# Patient Record
Sex: FEMALE | Race: WHITE | NOT HISPANIC OR LATINO | URBAN - METROPOLITAN AREA
[De-identification: names, ages, dates, MRNs, and addresses within clinical notes are randomized per-mention and may not be internally consistent; named-entity substitution may affect disease eponyms.]

---

## 2018-10-04 ENCOUNTER — OUTPATIENT (OUTPATIENT)
Dept: OUTPATIENT SERVICES | Facility: HOSPITAL | Age: 83
LOS: 1 days | Discharge: HOME | End: 2018-10-04

## 2018-10-04 DIAGNOSIS — E10.9 TYPE 1 DIABETES MELLITUS WITHOUT COMPLICATIONS: ICD-10-CM

## 2018-10-04 DIAGNOSIS — D64.9 ANEMIA, UNSPECIFIED: ICD-10-CM

## 2018-11-05 ENCOUNTER — OUTPATIENT (OUTPATIENT)
Dept: OUTPATIENT SERVICES | Facility: HOSPITAL | Age: 83
LOS: 1 days | Discharge: HOME | End: 2018-11-05

## 2018-11-05 DIAGNOSIS — R79.9 ABNORMAL FINDING OF BLOOD CHEMISTRY, UNSPECIFIED: ICD-10-CM

## 2018-11-05 DIAGNOSIS — D64.9 ANEMIA, UNSPECIFIED: ICD-10-CM

## 2019-01-14 ENCOUNTER — OUTPATIENT (OUTPATIENT)
Dept: OUTPATIENT SERVICES | Facility: HOSPITAL | Age: 84
LOS: 1 days | Discharge: HOME | End: 2019-01-14

## 2019-01-14 DIAGNOSIS — D50.9 IRON DEFICIENCY ANEMIA, UNSPECIFIED: ICD-10-CM

## 2019-01-14 DIAGNOSIS — R53.81 OTHER MALAISE: ICD-10-CM

## 2019-04-27 ENCOUNTER — OUTPATIENT (OUTPATIENT)
Dept: OUTPATIENT SERVICES | Facility: HOSPITAL | Age: 84
LOS: 1 days | Discharge: HOME | End: 2019-04-27

## 2019-04-27 DIAGNOSIS — N39.0 URINARY TRACT INFECTION, SITE NOT SPECIFIED: ICD-10-CM

## 2019-04-30 DIAGNOSIS — I10 ESSENTIAL (PRIMARY) HYPERTENSION: ICD-10-CM

## 2019-04-30 DIAGNOSIS — D64.9 ANEMIA, UNSPECIFIED: ICD-10-CM

## 2019-05-08 ENCOUNTER — OUTPATIENT (OUTPATIENT)
Dept: OUTPATIENT SERVICES | Facility: HOSPITAL | Age: 84
LOS: 1 days | Discharge: HOME | End: 2019-05-08

## 2019-05-08 DIAGNOSIS — R79.9 ABNORMAL FINDING OF BLOOD CHEMISTRY, UNSPECIFIED: ICD-10-CM

## 2019-11-12 ENCOUNTER — OUTPATIENT (OUTPATIENT)
Dept: OUTPATIENT SERVICES | Facility: HOSPITAL | Age: 84
LOS: 1 days | Discharge: HOME | End: 2019-11-12

## 2019-11-12 DIAGNOSIS — R94.5 ABNORMAL RESULTS OF LIVER FUNCTION STUDIES: ICD-10-CM

## 2019-11-12 DIAGNOSIS — A09 INFECTIOUS GASTROENTERITIS AND COLITIS, UNSPECIFIED: ICD-10-CM

## 2019-11-13 ENCOUNTER — OUTPATIENT (OUTPATIENT)
Dept: OUTPATIENT SERVICES | Facility: HOSPITAL | Age: 84
LOS: 1 days | Discharge: HOME | End: 2019-11-13

## 2019-11-14 DIAGNOSIS — N36.9 URETHRAL DISORDER, UNSPECIFIED: ICD-10-CM

## 2020-09-21 ENCOUNTER — EMERGENCY (EMERGENCY)
Facility: HOSPITAL | Age: 85
LOS: 0 days | Discharge: NURSING, REHAB OR LONG-TERM CARE | End: 2020-09-21
Attending: EMERGENCY MEDICINE | Admitting: EMERGENCY MEDICINE
Payer: MEDICARE

## 2020-09-21 VITALS
TEMPERATURE: 99 F | WEIGHT: 142.2 LBS | SYSTOLIC BLOOD PRESSURE: 154 MMHG | DIASTOLIC BLOOD PRESSURE: 116 MMHG | RESPIRATION RATE: 20 BRPM | HEIGHT: 65 IN | OXYGEN SATURATION: 100 % | HEART RATE: 107 BPM

## 2020-09-21 DIAGNOSIS — S09.90XA UNSPECIFIED INJURY OF HEAD, INITIAL ENCOUNTER: ICD-10-CM
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
## 2020-09-21 DIAGNOSIS — G30.9 ALZHEIMER'S DISEASE, UNSPECIFIED: ICD-10-CM

## 2020-09-21 DIAGNOSIS — S00.12XA CONTUSION OF LEFT EYELID AND PERIOCULAR AREA, INITIAL ENCOUNTER: ICD-10-CM
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
## 2020-09-21 DIAGNOSIS — F02.80 DEMENTIA IN OTHER DISEASES CLASSIFIED ELSEWHERE, UNSPECIFIED SEVERITY, WITHOUT BEHAVIORAL DISTURBANCE, PSYCHOTIC DISTURBANCE, MOOD DISTURBANCE, AND ANXIETY: ICD-10-CM

## 2020-09-21 PROCEDURE — 99284 EMERGENCY DEPT VISIT MOD MDM: CPT

## 2020-09-21 PROCEDURE — 70450 CT HEAD/BRAIN W/O DYE: CPT | Mod: 26

## 2020-09-21 RX ORDER — METOPROLOL TARTRATE 50 MG
1 TABLET ORAL
Qty: 0 | Refills: 0 | DISCHARGE

## 2020-09-21 RX ORDER — LANOLIN/MINERAL OIL
1 LOTION (ML) TOPICAL
Qty: 0 | Refills: 0 | DISCHARGE

## 2020-09-21 RX ORDER — CHOLECALCIFEROL (VITAMIN D3) 125 MCG
0 CAPSULE ORAL
Qty: 0 | Refills: 0 | DISCHARGE

## 2020-09-21 RX ORDER — ALPRAZOLAM 0.25 MG
1 TABLET ORAL
Qty: 0 | Refills: 0 | DISCHARGE

## 2020-09-21 RX ORDER — ACETAMINOPHEN 500 MG
2 TABLET ORAL
Qty: 0 | Refills: 0 | DISCHARGE

## 2020-09-21 RX ORDER — DOCUSATE SODIUM 100 MG
1 CAPSULE ORAL
Qty: 0 | Refills: 0 | DISCHARGE

## 2020-09-21 RX ORDER — CITALOPRAM 10 MG/1
1 TABLET, FILM COATED ORAL
Qty: 0 | Refills: 0 | DISCHARGE

## 2020-09-21 RX ORDER — CLOPIDOGREL BISULFATE 75 MG/1
1 TABLET, FILM COATED ORAL
Qty: 0 | Refills: 0 | DISCHARGE

## 2020-09-21 RX ORDER — FUROSEMIDE 40 MG
1 TABLET ORAL
Qty: 0 | Refills: 0 | DISCHARGE

## 2020-09-21 NOTE — ED PROVIDER NOTE - ATTENDING CONTRIBUTION TO CARE
isolated head trauma.  minor frontal hematoma.  EOMI.  neck NT,  neuro non focal.  CT results d/w son.  he does not wish any further intervention in view of the patient's state of advanced illness.

## 2020-09-21 NOTE — ED PROVIDER NOTE - PATIENT PORTAL LINK FT
You can access the FollowMyHealth Patient Portal offered by [facility] by registering at the following website: [email or web address]. By joining [facility]’s FollowMyHealth portal, you will also be able to view your health information using other applications (apps) compatible with our system.

## 2020-09-21 NOTE — ED ADULT NURSE NOTE - NS ED NRS NURSING HOMES
[facility] and [facility], [facility]

## 2020-09-21 NOTE — ED ADULT TRIAGE NOTE - CHIEF COMPLAINT QUOTE
Sent in by Alfredito Harry for a CT of her head after finding patient on floor with a hematoma to the left temple (eyebrow).

## 2020-09-21 NOTE — ED PROVIDER NOTE - NSFOLLOWUPCLINICS_GEN_ALL_ED_FT
[facility] [facility]  Ophthalmolgy  [address], [address]  [place] [address]  Phone: [phone]  Fax:   Follow Up Time: 7-10 Days

## 2020-09-21 NOTE — ED PROVIDER NOTE - OBJECTIVE STATEMENT
89yF with pmh alzheimers aaoX1 at baseline presents from [facility] with L eyebrow mild swelling, found on floor next to bed this am. history limited by patient dementia. comfort care at NH. collateral info obtained from son Carlos, who has been unable to see her for 6 months due to covid.

## 2020-09-21 NOTE — ED PROVIDER NOTE - NSFOLLOWUPINSTRUCTIONS_ED_ALL_ED_FT
Patient to be discharged from ED. Any available test results were discussed with patient and/or family. Verbal instructions given, including instructions to return to ED immediately for any new, worsening, or concerning symptoms. Patient endorsed understanding. Written discharge instructions additionally given, including follow-up plan.      EnglishSpanish                                                                                                                                                                                                                                                                                                                                                                                                                                                             Understanding Your Risk for Falls      Each year, millions of people suffer serious injuries from falls. It is important to understand your risk for falling. Talk with your health care provider about your risk and what you can do to lower it. There are actions you can take at home to lower your risk.    If you do have a serious fall, it is important to tell your health care provider. Falling once raises your risk for falling again.      How can falls affect me?  Serious injuries from falls are common. These include:  •Broken bones. Most hip fractures are caused by falls.      •Traumatic brain injury (TBI). Falls are the most common cause of TBI.      Fear of falling can also cause you to avoid activities and stay at home. This can make your muscles weaker and actually raise your risk for a fall.      What can increase my risk?  Serious injuries from a fall most often happen to people older than age 65. Children and young adults ages 15–29 are also at higher risk. The more risk factors you have for falling, the higher your risk. Risk factors include:  •Weakness in the lower body.      •Lack (deficiency) of vitamin D.      •Weak bones (osteoporosis).      •Being generally weak or confused due to long-term (chronic) illness.      •Dizziness or balance problems.      •Poor vision.      •Having depression.      •Medicine that causes dizziness or drowsiness. These can include medicines for your blood pressure, heart, anxiety, insomnia, or edema, as well as pain medicines and muscle relaxants.      •Drinking alcohol.      •Foot pain or improper footwear.      •Working at a dangerous job.      •Having had a fall in the past.      •Tripping hazards at home, such as floor clutter or loose rugs, or poor lighting.      •Having pets or clutter in your home.        What actions can I take to lower my risk of falling?                •Maintain physical fitness:  •Do strength and balance exercises. Consider taking a regular class to build strength and balance. Yoga and shree chi are good options.      •Have your eyes checked every year and your vision prescription updated as needed.        •Remove all clutter from walkways and stairways, including extension cords.      •Use a cordless phone.      • Do not use throw rugs. Make sure all carpeting is taped or tacked down securely.      •Use good lighting in all rooms. Keep a flashlight near your bed.      •Make sure there is a clear path from your bed to the bathroom. Use night-lights.      •Install grab bars for your tub, shower, and toilet. Use a bath mat in your tub or shower.      •Attach secure railings on both sides of your stairs.      •Repair uneven or broken steps.      •Use a cane or walker as directed by your health care provider.      •Wear nonskid shoes. Do not wear high heels. Do not walk around the house in socks or slippers.      •Avoid walking on icy or slippery surfaces. Walk on the grass instead of on icy or slick sidewalks. Where you can, use ice melt to get rid of ice on walkways.        Questions to ask your health care provider    •Can you help me evaluate my risk for a fall?      •Do any of my medicines make me more likely to fall?      •Should I take a vitamin D supplement?      •What exercises can I do to improve my strength and balance?      •Should I make an appointment to have my vision checked?      •Do I need a bone density test to check for osteoporosis?      •Would it help to use a cane or a walker?        Where to find more information    •Centers for Disease Control and Prevention, STEADI: cdc.gov      •Community-Based Fall Prevention Programs: cdc.gov      •National [place] on Aging: lb5qbrx.brisa.nih.gov        Contact a health care provider if:    •You fall at home.      •You are afraid of falling at home.      •You feel weak, drowsy, or dizzy at home.        Summary    •People 65 and older are at high risk for falling. However, older people are not the only ones injured in falls. Children and young adults have a higher-than-normal risk, too.      •Talk with your health care provider about your risks for falling and how to lower those risks.      •Taking certain precautions at home can lower your risk for falling.      •If you fall, always tell your health care provider.      This information is not intended to replace advice given to you by your health care provider. Make sure you discuss any questions you have with your health care provider.      Document Released: 08/01/2018 Document Revised: 03/19/2019 Document Reviewed: 08/01/2018    Elsevier Patient Education © 2020 [employer]

## 2020-09-21 NOTE — ED PROVIDER NOTE - PHYSICAL EXAMINATION
Vital Signs: I have reviewed the initial vital signs.  Constitutional: NAD, elderly, appears stated age, no acute distress.  HEENT: Head atraumatic Airway patent, moist MM, no erythema/swelling/deformity of oral structures. EOMI, PERRLA.  CV: regular rate, regular rhythm, well-perfused extremities, 2+ b/l DP and radial pulses equal.  Lungs: BCTA, no increased WOB.  ABD: NTND, no guarding or rebound, no pulsatile mass, no hernias.   MSK: Neck supple, nontender, nl ROM, no stepoff. Chest nontender. Back nontender in TLS spine or to b/l bony structures or flanks. Ext nontender, nl rom, no deformity.   INTEG: Skin warm, dry, no rash. small hematoma L eyebrow  NEURO: A&Ox1, moving all extremities, garbled speech  PSYCH: intermittently cooperative, but easily redirected

## 2020-09-21 NOTE — ED ADULT NURSE NOTE - NSIMPLEMENTINTERV_GEN_ALL_ED
Implemented All Fall with Harm Risk Interventions:  [place] to call system. Call bell, personal items and telephone within reach. Instruct patient to call for assistance. Room bathroom lighting operational. Non-slip footwear when patient is off stretcher. Physically safe environment: no spills, clutter or unnecessary equipment. Stretcher in lowest position, wheels locked, appropriate side rails in place. Provide visual cue, wrist band, yellow gown, etc. Monitor gait and stability. Monitor for mental status changes and reorient to person, place, and time. Review medications for side effects contributing to fall risk. Reinforce activity limits and safety measures with patient and family. Provide visual clues: red socks.

## 2020-09-21 NOTE — ED ADULT NURSE NOTE - PMH
Abnormal gait    Alzheimer disease    Anxiety    Constipation    Dementia    Dry eye syndrome    Edema    Hyperlipidemia    Hypertension

## 2020-09-21 NOTE — ED PROVIDER NOTE - PROGRESS NOTE DETAILS
resting comfortably.  CT images reviewed. pt in nad, sitting up in bed with pca helping at bedside d/w son re abnormal ct findings. he does not wish to go forward with ophtho investigation at this time. would like to prioritize comfort for her as she wishes to go back to NH.